# Patient Record
Sex: FEMALE | Race: OTHER | URBAN - METROPOLITAN AREA
[De-identification: names, ages, dates, MRNs, and addresses within clinical notes are randomized per-mention and may not be internally consistent; named-entity substitution may affect disease eponyms.]

---

## 2022-04-26 ENCOUNTER — OFFICE (OUTPATIENT)
Dept: URBAN - METROPOLITAN AREA CLINIC 105 | Facility: CLINIC | Age: 62
End: 2022-04-26

## 2022-04-26 DIAGNOSIS — Z86.010 PERSONAL HISTORY OF COLONIC POLYPS: ICD-10-CM

## 2022-04-26 PROCEDURE — 99202 OFFICE O/P NEW SF 15 MIN: CPT

## 2022-04-28 VITALS
DIASTOLIC BLOOD PRESSURE: 80 MMHG | HEART RATE: 95 BPM | WEIGHT: 200 LBS | SYSTOLIC BLOOD PRESSURE: 130 MMHG | HEIGHT: 61 IN

## 2022-07-12 ENCOUNTER — OFFICE (OUTPATIENT)
Dept: URBAN - METROPOLITAN AREA PATHOLOGY 24 | Facility: PATHOLOGY | Age: 62
End: 2022-07-12

## 2022-07-12 ENCOUNTER — AMBULATORY SURGICAL CENTER (OUTPATIENT)
Dept: URBAN - METROPOLITAN AREA SURGERY 26 | Facility: SURGERY | Age: 62
End: 2022-07-12

## 2022-07-12 DIAGNOSIS — D12.4 BENIGN NEOPLASM OF DESCENDING COLON: ICD-10-CM

## 2022-07-12 DIAGNOSIS — Z86.010 PERSONAL HISTORY OF COLONIC POLYPS: ICD-10-CM

## 2022-07-12 DIAGNOSIS — D12.5 BENIGN NEOPLASM OF SIGMOID COLON: ICD-10-CM

## 2022-07-12 DIAGNOSIS — K57.30 DIVERTICULOSIS OF LARGE INTESTINE WITHOUT PERFORATION OR ABS: ICD-10-CM

## 2022-07-12 PROCEDURE — 45380 COLONOSCOPY AND BIOPSY: CPT

## 2022-07-12 PROCEDURE — 88305 TISSUE EXAM BY PATHOLOGIST: CPT

## 2022-07-22 ENCOUNTER — OFFICE (OUTPATIENT)
Dept: URBAN - METROPOLITAN AREA CLINIC 105 | Facility: CLINIC | Age: 62
End: 2022-07-22

## 2022-07-22 VITALS
DIASTOLIC BLOOD PRESSURE: 70 MMHG | OXYGEN SATURATION: 98 % | SYSTOLIC BLOOD PRESSURE: 116 MMHG | HEART RATE: 102 BPM | HEIGHT: 61 IN | WEIGHT: 212 LBS

## 2022-07-22 DIAGNOSIS — K58.9 IRRITABLE BOWEL SYNDROME WITHOUT DIARRHEA: ICD-10-CM

## 2022-07-22 DIAGNOSIS — K64.9 UNSPECIFIED HEMORRHOIDS: ICD-10-CM

## 2022-07-22 DIAGNOSIS — Z86.010 PERSONAL HISTORY OF COLONIC POLYPS: ICD-10-CM

## 2022-07-22 DIAGNOSIS — R14.0 ABDOMINAL DISTENSION (GASEOUS): ICD-10-CM

## 2022-07-22 DIAGNOSIS — K57.90 DIVERTICULOSIS OF INTESTINE, PART UNSPECIFIED, WITHOUT PERFO: ICD-10-CM

## 2022-07-22 PROCEDURE — 99213 OFFICE O/P EST LOW 20 MIN: CPT

## 2022-07-22 NOTE — SERVICENOTES
-ordered blood test for celiac disease
-ordered stool test for H. pylori
-discussed low FODMAP diet for bloating
-try Phazyme for bloating
-try IBgard (peppermint oil) for abdominal discomfort
-follow-up in 3 months

## 2022-07-22 NOTE — SERVICEHPINOTES
Agueda Benítez   is seen today for a follow-up visit.  61-year-old woman who presents for follow-up of abdominal discomfort and bloating.  Patient had a colonoscopy 7/2022 which showed 2 polyps (sessile serrated adenoma and tubular adenoma ), left-sided diverticulosis and hemorrhoids.  She reports having occasional "annoyance" in her left upper quadrant that she describes as a discomfort.  Her symptoms improve with bowel movements.  She denies heartburn.  She reports having worsening symptoms when eating sugary meals or milk products.  She has a bowel movement every day that is formed and sausage shaped.  She denies issues of constipation or diarrhea.  She denies blood in stool or black stool.  She occasionally takes ibuprofen.  She occasionally takes Tums for abdominal discomfort.  She denies family history of colon cancer, so a disease or inflammatory bowel disease.  She was previously on amitriptyline for irritable bowel syndrome but is no longer taking this.

## 2022-10-21 ENCOUNTER — OFFICE (OUTPATIENT)
Dept: URBAN - METROPOLITAN AREA CLINIC 105 | Facility: CLINIC | Age: 62
End: 2022-10-21
Payer: COMMERCIAL

## 2022-10-21 VITALS
HEART RATE: 90 BPM | SYSTOLIC BLOOD PRESSURE: 110 MMHG | WEIGHT: 210 LBS | HEIGHT: 61 IN | DIASTOLIC BLOOD PRESSURE: 59 MMHG | OXYGEN SATURATION: 97 %

## 2022-10-21 DIAGNOSIS — Z86.010 PERSONAL HISTORY OF COLONIC POLYPS: ICD-10-CM

## 2022-10-21 DIAGNOSIS — K58.9 IRRITABLE BOWEL SYNDROME WITHOUT DIARRHEA: ICD-10-CM

## 2022-10-21 DIAGNOSIS — R14.0 ABDOMINAL DISTENSION (GASEOUS): ICD-10-CM

## 2022-10-21 PROCEDURE — 99213 OFFICE O/P EST LOW 20 MIN: CPT

## 2022-10-21 NOTE — SERVICEHPINOTES
Agueda Benítez   is seen today for a follow-up visit. Patient was last seen in clinic 7/22/2022 for follow-up of abdominal discomfort and bloating. She had reported an occasional annoyance in her left upper quadrant that improved with bowel movements. Also with worsening symptoms when eating sugary meals or milk products. She was previously on amitriptyline for IBS but was no longer taking this. Celiac panel and H. pylori stool test negative. She was advised to try Phazyme for bloating. She was also advised to try IBgard as needed for abdominal discomfort.
br
brPatient states that she is doing pretty good. She has been vacationing and traveling. Both her brothers and sisters are lactose intolerant. She has noticed that she avoid sodas her symptoms are better. Symptoms also improved if she watches what she eats. Certain foods will trigger her symptoms. She does not eat cheese and she avoids ice cream. Also avoids putting creamer and coffee. She reports 2 episodes of cramping since her last visit with slight discomfort. She did not try Phazyme. She has also has not tried IBgard.

## 2022-10-21 NOTE — SERVICENOTES
– Ordered sucrase breath test to evaluate for sucrose–isomaltase deficiency
– If you eat dairy, try taking Lactaid
– Discussed low FODMAP diet
– Try Phazyme as needed for bloating
– Try IBgard (peppermint oil) as needed for abdominal discomfort/pain
– Follow-up in 6 months

## 2023-06-12 ENCOUNTER — OFFICE (OUTPATIENT)
Dept: URBAN - METROPOLITAN AREA CLINIC 105 | Facility: CLINIC | Age: 63
End: 2023-06-12

## 2023-06-12 VITALS
HEART RATE: 75 BPM | WEIGHT: 190 LBS | SYSTOLIC BLOOD PRESSURE: 110 MMHG | DIASTOLIC BLOOD PRESSURE: 60 MMHG | OXYGEN SATURATION: 97 % | HEIGHT: 61 IN

## 2023-06-12 DIAGNOSIS — E74.31 SUCRASE-ISOMALTASE DEFICIENCY: ICD-10-CM

## 2023-06-12 DIAGNOSIS — K58.9 IRRITABLE BOWEL SYNDROME WITHOUT DIARRHEA: ICD-10-CM

## 2023-06-12 PROCEDURE — 99213 OFFICE O/P EST LOW 20 MIN: CPT

## 2023-06-12 NOTE — SERVICENOTES
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
– If you would like to eat dairy, try taking Lactaid
– Try Phazyme as needed for bloating
– Continue IBgard (peppermint oil) as needed for abdominal discomfort/pain
–Continue to make efforts to lose weight
– Follow-up in 1 year

## 2023-06-12 NOTE — SERVICEHPINOTES
Agueda Benítez   is seen today for a follow-up visit.  
torsten
brPatient presents for follow-up of IBS, bloating and CSID. Patient last seen in clinic 10/21/2022. At that time she reported feeling pretty well. She had noticed that her symptoms improved after avoiding sodas and certain food triggers. She was avoiding dairy products. She did not try Phazyme or IBgard. Patient was advised to try taking Lactaid when eating dairy. We also discussed low FODMAP diet. Patient was advised to try Phazyme as needed for bloating and IBgard as needed for abdominal discomfort/pain. Patient has sucrose breath test with low sucrase activity. Patient was prescribed Sucraid.torsten sarmiento
Patient reports improvement in her symptoms. She has lost approximately 20 pounds intentionally. She did not feel her Sucraid due to it not being clear how much out-of-pocket cost it would be. She reports some improvement with IBgard. She has noticed a significant improvement in her symptoms with dietary modifications. She has not tried Phazyme. She has not tried Lactaid.
torsten sarmientoReviewed labs from 6/2023. CMP with mildly elevated alkaline phosphatase at 139 but otherwise normal. Hemoglobin A1c 6.3%. TSH mildly low at 0.25 but free T4 normal at 1.4.